# Patient Record
Sex: FEMALE | Race: WHITE | NOT HISPANIC OR LATINO | Employment: OTHER | URBAN - METROPOLITAN AREA
[De-identification: names, ages, dates, MRNs, and addresses within clinical notes are randomized per-mention and may not be internally consistent; named-entity substitution may affect disease eponyms.]

---

## 2018-10-22 ENCOUNTER — TELEPHONE (OUTPATIENT)
Dept: PSYCHOLOGY | Facility: CLINIC | Age: 58
End: 2018-10-22

## 2018-10-22 ENCOUNTER — TRANSCRIBE ORDERS (OUTPATIENT)
Dept: ADMINISTRATIVE | Facility: HOSPITAL | Age: 58
End: 2018-10-22

## 2018-10-22 ENCOUNTER — APPOINTMENT (OUTPATIENT)
Dept: LAB | Facility: IMAGING CENTER | Age: 58
End: 2018-10-22
Payer: COMMERCIAL

## 2018-10-22 DIAGNOSIS — E78.5 HYPERLIPIDEMIA, UNSPECIFIED HYPERLIPIDEMIA TYPE: ICD-10-CM

## 2018-10-22 DIAGNOSIS — R41.3 MEMORY LOSS: ICD-10-CM

## 2018-10-22 DIAGNOSIS — F45.8 ANXIETY HYPERVENTILATION: ICD-10-CM

## 2018-10-22 DIAGNOSIS — E78.5 HYPERLIPIDEMIA, UNSPECIFIED HYPERLIPIDEMIA TYPE: Primary | ICD-10-CM

## 2018-10-22 DIAGNOSIS — F41.9 ANXIETY HYPERVENTILATION: ICD-10-CM

## 2018-10-22 DIAGNOSIS — I10 ESSENTIAL HYPERTENSION, MALIGNANT: ICD-10-CM

## 2018-10-22 DIAGNOSIS — R41.3 MEMORY LOSS: Primary | ICD-10-CM

## 2018-10-22 LAB
25(OH)D3 SERPL-MCNC: 32.7 NG/ML (ref 30–100)
ALBUMIN SERPL BCP-MCNC: 4 G/DL (ref 3.5–5)
ALP SERPL-CCNC: 65 U/L (ref 46–116)
ALT SERPL W P-5'-P-CCNC: 24 U/L (ref 12–78)
ANION GAP SERPL CALCULATED.3IONS-SCNC: 4 MMOL/L (ref 4–13)
AST SERPL W P-5'-P-CCNC: 12 U/L (ref 5–45)
BASOPHILS # BLD AUTO: 0.07 THOUSANDS/ΜL (ref 0–0.1)
BASOPHILS NFR BLD AUTO: 1 % (ref 0–1)
BILIRUB SERPL-MCNC: 0.49 MG/DL (ref 0.2–1)
BUN SERPL-MCNC: 13 MG/DL (ref 5–25)
CALCIUM SERPL-MCNC: 9.2 MG/DL (ref 8.3–10.1)
CHLORIDE SERPL-SCNC: 103 MMOL/L (ref 100–108)
CHOLEST SERPL-MCNC: 176 MG/DL (ref 50–200)
CO2 SERPL-SCNC: 30 MMOL/L (ref 21–32)
CREAT SERPL-MCNC: 0.83 MG/DL (ref 0.6–1.3)
EOSINOPHIL # BLD AUTO: 0.14 THOUSAND/ΜL (ref 0–0.61)
EOSINOPHIL NFR BLD AUTO: 3 % (ref 0–6)
ERYTHROCYTE [DISTWIDTH] IN BLOOD BY AUTOMATED COUNT: 12.8 % (ref 11.6–15.1)
GFR SERPL CREATININE-BSD FRML MDRD: 77 ML/MIN/1.73SQ M
GLUCOSE P FAST SERPL-MCNC: 84 MG/DL (ref 65–99)
HCT VFR BLD AUTO: 41.3 % (ref 34.8–46.1)
HDLC SERPL-MCNC: 52 MG/DL (ref 40–60)
HGB BLD-MCNC: 13 G/DL (ref 11.5–15.4)
IMM GRANULOCYTES # BLD AUTO: 0.01 THOUSAND/UL (ref 0–0.2)
IMM GRANULOCYTES NFR BLD AUTO: 0 % (ref 0–2)
LDLC SERPL CALC-MCNC: 93 MG/DL (ref 0–100)
LYMPHOCYTES # BLD AUTO: 1.7 THOUSANDS/ΜL (ref 0.6–4.47)
LYMPHOCYTES NFR BLD AUTO: 30 % (ref 14–44)
MCH RBC QN AUTO: 29.4 PG (ref 26.8–34.3)
MCHC RBC AUTO-ENTMCNC: 31.5 G/DL (ref 31.4–37.4)
MCV RBC AUTO: 93 FL (ref 82–98)
MONOCYTES # BLD AUTO: 0.58 THOUSAND/ΜL (ref 0.17–1.22)
MONOCYTES NFR BLD AUTO: 10 % (ref 4–12)
NEUTROPHILS # BLD AUTO: 3.16 THOUSANDS/ΜL (ref 1.85–7.62)
NEUTS SEG NFR BLD AUTO: 56 % (ref 43–75)
NONHDLC SERPL-MCNC: 124 MG/DL
NRBC BLD AUTO-RTO: 0 /100 WBCS
PLATELET # BLD AUTO: 312 THOUSANDS/UL (ref 149–390)
PMV BLD AUTO: 8.9 FL (ref 8.9–12.7)
POTASSIUM SERPL-SCNC: 4.8 MMOL/L (ref 3.5–5.3)
PROT SERPL-MCNC: 7.8 G/DL (ref 6.4–8.2)
RBC # BLD AUTO: 4.42 MILLION/UL (ref 3.81–5.12)
SODIUM SERPL-SCNC: 137 MMOL/L (ref 136–145)
TRIGL SERPL-MCNC: 156 MG/DL
TSH SERPL DL<=0.05 MIU/L-ACNC: 1.89 UIU/ML (ref 0.36–3.74)
VIT B12 SERPL-MCNC: 593 PG/ML (ref 100–900)
WBC # BLD AUTO: 5.66 THOUSAND/UL (ref 4.31–10.16)

## 2018-10-22 PROCEDURE — 82306 VITAMIN D 25 HYDROXY: CPT

## 2018-10-22 PROCEDURE — 80053 COMPREHEN METABOLIC PANEL: CPT

## 2018-10-22 PROCEDURE — 83918 ORGANIC ACIDS TOTAL QUANT: CPT

## 2018-10-22 PROCEDURE — 85025 COMPLETE CBC W/AUTO DIFF WBC: CPT

## 2018-10-22 PROCEDURE — 84443 ASSAY THYROID STIM HORMONE: CPT

## 2018-10-22 PROCEDURE — 80061 LIPID PANEL: CPT

## 2018-10-22 PROCEDURE — 82607 VITAMIN B-12: CPT

## 2018-10-22 PROCEDURE — 36415 COLL VENOUS BLD VENIPUNCTURE: CPT

## 2018-10-22 NOTE — TELEPHONE ENCOUNTER
[facility] Intake Assessment     Presenting Stressors: PATIENT IS FEELING LOSS DEPRESSED AND ANXIOUS PATIENT STATES SHE DOES NOT FEEL UNSAFE RAPID CYCLING  Referral Source: DR Mickey Lal   she is employed at [address] to [employer]? No  Is she a smoker? no    Symptoms: depressed mood, anxiety and SLEEP DISTRUBANCE LOST OF APPETITE      No current outpatient prescriptions on file  Medications: BUSPAR 15 MG 2 IN AM 1 PM, EFFEXOR 225 MG 1 TAB AM, TRAZADONE 50 MG 1 1/2 HS, LORAZAPAM 0 5 MG 1 TAB TWICE DAILY, GABAPENTIN 400 MG TWICE DAILY FOR SHINGLES (NUEROPATHY    Allergies not on file    Allergies: NKA    Provisional Diagnosis:   Axis I:DEPRESSION WITH ANXIETY   Axis II: NONE    Substance Abuse:No concerns of substance abuse are reported      Psychiatric Treatment History:     Current psychiatrist: DR Rodo Nicole Agency Supports: NO  The patient requires ambulatory assistance: NO    Legal Issues: NO LEGAL ISSUES    Action: NONE    ACCEPTED Appointment Date: NONE    DENIED Reason: NONE

## 2018-10-25 LAB
METHYLMALONATE SERPL-SCNC: 236 NMOL/L (ref 0–378)
SL AMB DISCLAIMER: NORMAL

## 2018-11-07 ENCOUNTER — TELEPHONE (OUTPATIENT)
Dept: PSYCHOLOGY | Facility: CLINIC | Age: 58
End: 2018-11-07

## 2018-11-07 NOTE — TELEPHONE ENCOUNTER
----- Message from Clarisa Collins sent at 10/22/2018  2:41 PM EDT -----  Zeynepa Fix - but if unsafe direct to ED  ----- Message -----  From: Riley Piedra  Sent: 10/22/2018   2:03 PM  To: Jeremiah Julian FROM DR Gary Redding

## 2019-01-28 ENCOUNTER — TRANSCRIBE ORDERS (OUTPATIENT)
Dept: ADMINISTRATIVE | Facility: HOSPITAL | Age: 59
End: 2019-01-28

## 2019-01-28 ENCOUNTER — HOSPITAL ENCOUNTER (OUTPATIENT)
Dept: RADIOLOGY | Facility: IMAGING CENTER | Age: 59
Discharge: HOME | End: 2019-01-28
Payer: COMMERCIAL

## 2019-01-28 DIAGNOSIS — R05.9 COUGH: Primary | ICD-10-CM

## 2019-01-28 DIAGNOSIS — R05.9 COUGH: ICD-10-CM

## 2019-01-28 PROCEDURE — 71046 X-RAY EXAM CHEST 2 VIEWS: CPT

## 2019-02-04 ENCOUNTER — TELEPHONE (OUTPATIENT)
Dept: PSYCHOLOGY | Facility: CLINIC | Age: 59
End: 2019-02-04

## 2019-02-14 ENCOUNTER — TELEPHONE (OUTPATIENT)
Dept: PSYCHOLOGY | Facility: CLINIC | Age: 59
End: 2019-02-14

## 2019-02-14 NOTE — TELEPHONE ENCOUNTER
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 No  Is she a smoker? no    Symptoms: depressed mood, anxiety and SLEEP DISTRUBANCE APPETITE CHANGES      No current outpatient medications on file  Medications: LORAZAPAM 0 5 MG TWICE DAILY, TRAZADONE 50 MG 1/1/2 TAB AT NIGHT, EFFERXOR 225 MG DAILY AM, ABILIFY 5MG ONCE IN PM    Allergies not on file    Allergies: NKA    Provisional Diagnosis:   Axis I:DEPRESSION SERVERE DISORDER   Axis II: ANXIETY    Substance Abuse:No concerns of substance abuse are reported      Psychiatric Treatment History:     Current psychiatrist: DR Florence Andre  Therapist: SPENCER PAYNE  Community Agency Supports: NO  The patient requires ambulatory assistance: NO    Legal Issues: NO LEGAL ISSUES    Action: NONE    ACCEPTED Appointment Date: NONE    DENIED Reason: NONE

## 2019-02-25 ENCOUNTER — OFFICE VISIT (OUTPATIENT)
Dept: PSYCHIATRY | Facility: CLINIC | Age: 59
End: 2019-02-25
Payer: COMMERCIAL

## 2019-02-25 ENCOUNTER — OFFICE VISIT (OUTPATIENT)
Dept: PSYCHOLOGY | Facility: CLINIC | Age: 59
End: 2019-02-25
Payer: COMMERCIAL

## 2019-02-25 VITALS
BODY MASS INDEX: 30.76 KG/M2 | RESPIRATION RATE: 18 BRPM | DIASTOLIC BLOOD PRESSURE: 60 MMHG | HEIGHT: 67 IN | WEIGHT: 196 LBS | HEART RATE: 82 BPM | SYSTOLIC BLOOD PRESSURE: 110 MMHG | TEMPERATURE: 98.1 F

## 2019-02-25 DIAGNOSIS — F33.2 SEVERE RECURRENT MAJOR DEPRESSION WITHOUT PSYCHOTIC FEATURES (HCC): Primary | ICD-10-CM

## 2019-02-25 DIAGNOSIS — F33.2 MDD (MAJOR DEPRESSIVE DISORDER), RECURRENT SEVERE, WITHOUT PSYCHOSIS (HCC): Primary | ICD-10-CM

## 2019-02-25 DIAGNOSIS — F41.1 GENERALIZED ANXIETY DISORDER: ICD-10-CM

## 2019-02-25 PROBLEM — R41.3 MEMORY LOSS: Status: ACTIVE | Noted: 2018-07-02

## 2019-02-25 PROBLEM — B02.8 HERPES ZOSTER COMPLICATED: Status: ACTIVE | Noted: 2018-07-02

## 2019-02-25 PROBLEM — R53.83 FATIGUE: Status: ACTIVE | Noted: 2019-02-25

## 2019-02-25 PROBLEM — G47.00 INSOMNIA: Status: ACTIVE | Noted: 2019-02-25

## 2019-02-25 PROBLEM — J30.9 ALLERGIC RHINITIS: Status: ACTIVE | Noted: 2019-02-25

## 2019-02-25 PROBLEM — M19.90 OSTEOARTHRITIS: Status: ACTIVE | Noted: 2019-02-25

## 2019-02-25 PROCEDURE — G0410 GRP PSYCH PARTIAL HOSP 45-50: HCPCS

## 2019-02-25 PROCEDURE — 90791 PSYCH DIAGNOSTIC EVALUATION: CPT

## 2019-02-25 PROCEDURE — S0201 PARTIAL HOSPITALIZATION SERV: HCPCS

## 2019-02-25 PROCEDURE — G0177 OPPS/PHP; TRAIN & EDUC SERV: HCPCS

## 2019-02-25 PROCEDURE — 90792 PSYCH DIAG EVAL W/MED SRVCS: CPT | Performed by: PSYCHIATRY & NEUROLOGY

## 2019-02-25 RX ORDER — LOSARTAN POTASSIUM 50 MG/1
50 TABLET ORAL DAILY
COMMUNITY
Start: 2016-05-05

## 2019-02-25 RX ORDER — FLUTICASONE PROPIONATE 50 MCG
2 SPRAY, SUSPENSION (ML) NASAL DAILY
COMMUNITY

## 2019-02-25 RX ORDER — LOVASTATIN 10 MG/1
10 TABLET ORAL
COMMUNITY

## 2019-02-25 RX ORDER — ALBUTEROL SULFATE 90 UG/1
2 AEROSOL, METERED RESPIRATORY (INHALATION) EVERY 4 HOURS PRN
COMMUNITY

## 2019-02-25 RX ORDER — LORAZEPAM 0.5 MG/1
0.5 TABLET ORAL 2 TIMES DAILY
Qty: 60 TABLET | Refills: 0 | Status: SHIPPED | OUTPATIENT
Start: 2019-02-25

## 2019-02-25 RX ORDER — VENLAFAXINE HYDROCHLORIDE 225 MG/1
225 TABLET, EXTENDED RELEASE ORAL DAILY
COMMUNITY
End: 2019-02-26 | Stop reason: SDUPTHER

## 2019-02-25 RX ORDER — GABAPENTIN 400 MG/1
400 CAPSULE ORAL 2 TIMES DAILY
COMMUNITY

## 2019-02-25 RX ORDER — RANITIDINE 150 MG/1
1 TABLET ORAL EVERY 24 HOURS
COMMUNITY
Start: 2016-06-07

## 2019-02-25 RX ORDER — LORAZEPAM 0.5 MG/1
0.5 TABLET ORAL 2 TIMES DAILY
COMMUNITY
End: 2019-02-25 | Stop reason: SDUPTHER

## 2019-02-25 RX ORDER — TRAZODONE HYDROCHLORIDE 50 MG/1
75 TABLET ORAL
COMMUNITY

## 2019-02-25 RX ORDER — ARIPIPRAZOLE 5 MG/1
5 TABLET ORAL DAILY
COMMUNITY
End: 2019-02-26 | Stop reason: SDUPTHER

## 2019-02-25 NOTE — PSYCH
Assessment/Plan:      Diagnoses and all orders for this visit:    Severe recurrent major depression without psychotic features ([facility] [address] )          Subjective:     Patient ID: Doris Mares is a 59 y o  female  [facility] Treatment Plan   AREAS OF NEED:  Depression as evidence by poor sleep, fatigue, anhedonia, emotional dysregulation r/t loss of job due to illness  Date Initiated: 02/25/2019    Strengths: Good supportive , willing to seek treatment, resilient and well educated  LONG TERM GOAL:   Date Initiated: 02/25/2019  1 0 I will demonstrate 2 new ways of verbal expression of thoughts and feelings that I have learned through program   Target Date: 03/25/2019  Completion Date:      SHORT TERM OBJECTIVES:     Date Initiated: 02/25/2019  1 1  I will increase my emotional vocabulary and practice " I" statements and feelings that I have learned in program   Revision Date:   Target Date: 03/06/2019  Completion Date:     Date Initiated: 02/25/2019  1 2 I will identify 1 negative thought daily and learn cognitive skills to help reframe my negative thinking into more adaptive thinking  Revision Date:  Target Date: 03/06/2019  Completion Date:    Date Initiated: 02/25/2019  1 3 I will take medications as prescribed and share any questions and concerns if arise  Revision Date:  Target Date:  03/06/2019  Completion Date:      Date Initiated:02/25/2019  1 4 I will identify 3 ways my supports can assist in my recovery and agree to staff/support contact as indicated    Revision Date:  Target Date: 03/06/2019  Completion Date:           7 DAY REVISION:    Date Initiated:  Revision Date:   Target Date:   Completion Date:      PSYCHIATRY:  Date Initiated: 02/25/2019  Medication Management and Education       Revision Date:   The person(s) responsible for carrying out the plan is Sawyer Maharaj MD    NURSING:   Date Initiated:  02/25/2019  1 1,1 2,1 3,1 4 This RN will provide daily wellness group five days weekly to educate Barbara Ocampo on S/S of her diagnoses and medications used in treatment  Revision Date:  The person(s) responsible for carrying out the plan is Ambreen Clemente RN    PSYCHOLOGY:   Date Initiated:  02/25/2019       1 1, 1 2, 1 4 Provide psychotherapy group 5 times per week to allow opportunity for Barbara Ocampo  to explore stressors and ways of coping  Revision Date:   The person(s) responsible for carrying out the plan is Nahum Moralez [place]    ALLIED THERAPY:   Date Initiated:  02/25/2019  1 1,1 2 Engage Barbara Ocampo in AT group 5 times daily to encourage development and use of wellness tools to decrease symptoms and promote recovery through meaningful activity  Revision Date:   The person(s) responsible for carrying out the plan is JUDI Eubanks     CASE MANAGEMENT:   Date Initiated:  02/25/2019      1 0 This  will meet with Barbara Ocampo  3-4 times weekly to assess treatment progress, discharge planning, connection to community supports and UR as indicated  Revision Date:   The person(s) responsible for carrying out the plan is Ambreen Clemente RN         TREATMENT REVIEW/COMMENTS:   Discharge Criteria:  Identify 3 signs of progress and complete relapse prevention plan  DISCHARGE PLAN: Continue with Dr Owen Cheadle (OP Psychiatrist ) for medication management and with Mer Park (OP therapist)  Estimated Length of Stay : 10 treatment days  Diagnosis and Treatment Plan explained to Luis Miguel Lund relates understanding diagnosis and is agreeable to Treatment Plan       CLIENT COMMENTS / Please share your thoughts, feelings, need and/or experiences regarding your treatment plan: _____________________________________________________________________________________________________________________________________________________________________________________________________________________________________________________________________________________________________________________ Date/Time: ______________   Patient Signature: _________________________________   Date/Time: ______________    Signature: _________________________________        Date/Time: ______________

## 2019-02-25 NOTE — PSYCH
Assessment/Plan:      Diagnoses and all orders for this visit:    Severe recurrent major depression without psychotic features ([facility] [address] )          Subjective:     Patient ID: Al Iyer is a 59 y o  female  Pre-morbid level of function and History of Present Illness:   HPI : Per MD Eric Young a 59 y o  female with depression  and anxiety  referred by  Dr Costa Arroyo to depressed mood, anxiety ,  sleep disturbance and  appetite changes    Onset of symptoms was a few months ago with gradually worsening course since that time  Psychosocial Stressors: health  She developed Shingles on 9/2017 and has lot of sequela  She had a neuropsychiatric evaluation and  Was found that her executive function has decreased , she was not able to continue working and that has affected her life  She has feel more depressed since them  She states her  is very supportive  Guru Tom  anxious, denies any suicidal or homicidal ideation, plan or intent  She denies any psychotic symptoms or manic episode  Her PHQ-9 is 11       Per this writer: Jess Munoz [facility] from Dr Katerina Fu with increased depression and anxiety  Jess Baker stopped working 2 years ago after she developed shingles that left her with a lot of mental deficits  She was unable to continue her job of 30 years as an  and as a result had to go on long term disability August 2018  She stated, " ever since I admitted that I was a person on disability I have been depressed, anxious and I feel useless  I was good at my job and now I have nothing to do all day except take care of my 3 dogs and 1 cat  That is the highlight of my day  My , Eduardo Ricardo, is very supportive and encourages me to go out and socialize but I do not have the strength not the desire to do that   I need to get my life to mean something and not feel helpless " Jess Baker denies SI/HI and is not psychotic or delusional on assessment  She lives with her  of 7 yrs and never had children        Strengths: Good supportive , willing to seek treatment, resilient and well educated                 Reason for evaluation and partial hospitalization as an alternative to inpatient hospitalization PHP is medically necessary to prevent hospitalization as outpatient care has been unable to stabilize [facility] and a greater intensity of treatment is indicated  Milieu therapy to monitor for medication needs, provide wellness tools education and offer opportunity to share and connect to others  Group therapy, case management, psychiatric medication management, family contact and UR as indicated  ELOS 10 treatment days  Previous Psychiatric/psychological treatment/year: Past Inpatient Psychiatric Treatment:   Never admitted, was  In Partial on the past     Past Outpatient Psychiatric Treatment:    Current psychiatrist: Dr Shira Gandara  Past Suicide Attempts:              UD    Outpatient and/or Partial and Other Community Resources Used (CTT, ICM, VNA): N/A      Problem Assessment:     SOCIAL/VOCATION:  Family Constellation (include parents, relationship with each and pertinent Psych/Medical History):     Family History   Problem Relation Age of Onset    Diabetes Mother     Hypertension Mother     Lupus Mother         systemic lupus erythematosus    Throat cancer Father        Family History   Problem Relation Age of Onset    Diabetes Mother      Hypertension Mother      Lupus Mother           systemic lupus erythematosus    Throat cancer Father        Social History:     Education: high school diploma/GED  Learning Disabilities: none  Marital history:   Living arrangement, social support: she lives with her     Occupational History: on permanent disability  Functioning Relationships: good support system    Other Pertinent History: no legal or Bridgette Adolfo        Who is the person you relate to best : , Jona Olson she lives with her     she does not live alone  Domestic Violence: No past history of domestic violence        LEISURE ASSESSMENT (Include past and present hobbies/interests and level of involvement (Ex: Group/Club Affiliations): None  Her primary language is [place]  Preferred language is [place]  Ethnic considerations are cauasian  Religions affiliations and level of involvement Lutheran   FUNCTIONAL STATUS: There has been a recent change in the patient's ability to do the following: meal preparation    Level of Assistance Needed/By Whom?: independent      [facility] learns best by  reading and listening    SUBSTANCE ABUSE ASSESSMENT: no substance abuse    Do you currently smoke? NoOffered smoking cessation? No    Substance/Route/Age/Amount/Frequency/Last Use: None    DETOX HISTORY: n/a    Previous detox/rehab treatment: None    HEALTH ASSESSMENT: no nausea and no vomiting    LEGAL: No Mental Health Advance Directive or Power of  on file    Risk Assessment:   The following ratings are based on my observation of this patient over the last interview assesssment    Risk of Harm to Self:   Demographic risk factors include   Historical Risk Factors include none  Recent Specific Risk Factors include diagnosis of depression     Risk of Harm to Others:   Demographic Risk Factors include unemployed  Historical Risk Factors include N/A  Recent Specific Risk Factors include N/A    Access to Weapons: [facility] has access to the following weapons: None   The following steps have been taken to ensure weapons are properly secured:N/A    Based on the above information, the client presents the following risk of harm to self or others:  low    The following interventions are recommended:   no intervention changes    Notes regarding this Risk Assessment:  On call no given     Review Of Systems:     Mood Anxiety and Depression   Behavior Normal    Thought Content Normal   General Normal    Personality Normal   Other Psych Symptoms Normal   Constitutional Negative   ENT Negative   Cardiovascular Negative   Respiratory Negative   Gastrointestinal Negative   Genitourinary Negative   Musculoskeletal Negative   Integumentary Negative   Neurological    Endocrine Normal    Other Symptoms Normal        Mental status:  Appearance calm and cooperative , adequate hygiene and grooming and good eye contact    Mood depressed   Affect affect was flat   Speech a normal rate   Thought Processes coherent/organized   Hallucinations no hallucinations present    Thought Content no delusions   Abnormal Thoughts no suicidal thoughts  and no homicidal thoughts    Orientation  oriented to person and place and time   Remote Memory short term memory intact and long term memory intact   Attention Span concentration intact   Intellect Appears to be of Average Intelligence   Fund of Knowledge displays adequate knowledge of current events, adequate fund of knowledge regarding past history and adequate fund of knowledge regarding vocabulary    Insight Insight intact   Judgement judgment was intact   Muscle Strength Muscle strength and tone were normal   Language no difficulty naming common objects, no difficulty repeating a phrase  and no difficulty writing a sentence    Pain none   Pain Scale 0       DSM:   1   Se[facility] features [facility]         Plan[facility]    Anticipated aftercare plan:  Continue with Dr Katerina Fu( OP Psychiatrist) and Nina Felton (OP Therapist)

## 2019-02-25 NOTE — PSYCH
Subjective:     Patient ID: Palmoa Moon is a 59 y o  female  [facility] Clinical Progress Notes      Specialized Services Documentation  Therapist must complete separate progress note for each specific clinical activity in which the individual participated during the day  PHQ-9 Depression Screening    PHQ-9:    Frequency of the following problems over the past two weeks:       Little interest or pleasure in doing things:  1 - several days  Feeling down, depressed, or hopeless:  1 - several days  Trouble falling or staying asleep, or sleeping too much:  2 - more than half the days  Feeling tired or having little energy:  2 - more than half the days  Poor appetite or overeatin - several days  Feeling bad about yourself - or that you are a failure or have let yourself or your family down:  1 - several days  Trouble concentrating on things, such as reading the newspaper or watching television:  1 - several days  Moving or speaking so slowly that other people could have noticed   Or the opposite - being so fidgety or restless that you have been moving around a lot more than usual:  2 - more than half the days  Thoughts that you would be better off dead, or of hurting yourself in some way:  0 - not at all  PHQ-2 Score:  2  PHQ-9 Score:  11

## 2019-02-25 NOTE — PSYCH
Subjective:      Patient ID: Bobby Vela is a 59 y o  female      [facility]ations Clinical Progress Notes       Specialized Services Documentation  Therapist must complete separate progress note for each specific clinical activity in which the individual participated during the day          Group Psychotherapy ([phone]) Mati Hayes, [facility]      Bobby Vela attended wellness group today on : Living with Bipolar: Educational Video entitled [employer] viewed  Discussion  started off with definition of Bipolar Disorder which can be referred  as  manic-depressive disorder    Video viewed  After  the video discussion  began with  : For people living with Bipolar , the view of life can seem dark and ominous or deceptively beautiful  Bipolar illness can twist and distort the mind of physically healthy individuals, to the point that their jobs their families and even lives can be lost   But with education, therapy and medication management, people can regain what they have lost and perhaps live in a world better than they have known  Information provided on all the resources available to treat this disease  Support groups like [facility] ([address]) is a great tool available for people diagnosed with this illness, families and friends  Was able to understand better that with proper diagnosis,  medication and education, this illness is manageable and treatable  Taiwo Rolon did verbalize that medication compliance as well as therapy is so important for mental health    She was able to recognize that " it caught me off guard when they used the word "strange" to describe mental illness and how much education is still needed to get rid of the sigma associated with mental illness " Taiwo Rolon will continue to attend wellness groups in order to achieve goals and objectives                    Tx Plan Objective 1 1,1 2,1 3,1 4     Kat Stewart from [place] [address] ([phone]) authorized 5 PHP days (02/25/2019-03/01/2019)  Auth No  [id number]  Case Management Note Evelina Vaughan RN ([phone])      Per this writer: Shital Lin to [facility] from Dr Colleen Manzo with increased depression and anxiety  Eric Simon stopped working 2 years ago after she developed shingles that left her with a lot of mental deficits  Liam Mcclain was unable to continue her job of 30 years as an  and as a result had to go on long term disability August 2018   She stated, " ever since I admitted that I was a person on disability I have been depressed, anxious and I feel useless   I was good at my job and now I have nothing to do all day except take care of my 3 dogs and 1 cat   That is the highlight of my day   My , Brenden Montelongo, is very supportive and encourages me to go out and socialize but I do not have the strength not the desire to do that  I need to get my life to mean something and not feel helpless " Vick Goff denies SI/HI and is not psychotic or delusional on assessment   She lives with her  of 7 yrs and never had children        Strengths: Good supportive , willing to seek treatment, resilient and well educated                Current suicide risk : Low   Medications changes/added/denied? No   Treatment session number: No 1   Individual Case Management Visit provided today? Yes    [facility]ations follow up physician's orders: None at this time

## 2019-02-25 NOTE — PSYCH
Subjective:     Patient ID: Yeny Mccray is a 59 y o  female  In[facility]ations Clinical Progress Notes      Specialized Services Documentation  Therapist must complete separate progress note for each specific clinical activity in which the individual participated during the day  Group Psychotherapy     ([id number]) Yeny Mccray participated in group psychotherapy process today  Group began with clients participating in a mindfulness exercise focusing on becoming present in this moment  Clients checked in, shared how they were feeling, and progress they made over the weekend or a setback they encountered  Clients engaged in a discussion on current struggles and offered support to one another  Clients imparted on information and shared with others their struggles in relationships, sleep, and overwhelming themselves  They built group cohesiveness and shared experiencing, growing their universality  This writer shared insight on building SMART goals, and clients discussed struggles with goals  Guille Murray shared feeling "okay" and she demonstrated self-restraint over the weekend  She was supportive of a peer despite it being her first day and offered support based upon her experience working for 30 years in a high stress position  Tyler Blanco made progress towards goals through group participation and is encouraged to continue progressing towards long term goals through program compliance and participation       Tx Plan Objective: 1 1 Therapist:  Saul Odonnell LPC

## 2019-02-25 NOTE — PSYCH
Subjective:     Patient ID: Faiza Sanford is a 59 y o  female  [facility]ations Clinical Progress Notes      Specialized Services Documentation  Therapist must complete separate progress note for each specific clinical activity in which the individual participated during the day  Allied Therapy  [id number]  Faiza Sanford was excused from [facility] as she was undergoing the intake process    Therapist: Rosario Heard    Education Therapy   Time:  2255-3448  Previous goal met: First Treatment Day  Readiness to Learning: Receptive  Barriers to Learning: None  Learning Assessment  Time: 3675-6935  Education Completed: Illness, Medication and Wellness Tools  Teaching Method: Verbal and Demonstration  Shared Area of Learning: Yes   Goal Set: Exercise  Tx Plan Objective: 1 1, Therapist: Rosario Heard

## 2019-02-26 ENCOUNTER — OFFICE VISIT (OUTPATIENT)
Dept: PSYCHOLOGY | Facility: CLINIC | Age: 59
End: 2019-02-26
Payer: COMMERCIAL

## 2019-02-26 DIAGNOSIS — F33.2 SEVERE RECURRENT MAJOR DEPRESSION WITHOUT PSYCHOTIC FEATURES (HCC): ICD-10-CM

## 2019-02-26 DIAGNOSIS — F33.2 MDD (MAJOR DEPRESSIVE DISORDER), RECURRENT SEVERE, WITHOUT PSYCHOSIS (HCC): Primary | ICD-10-CM

## 2019-02-26 PROCEDURE — G0177 OPPS/PHP; TRAIN & EDUC SERV: HCPCS

## 2019-02-26 PROCEDURE — S0201 PARTIAL HOSPITALIZATION SERV: HCPCS

## 2019-02-26 PROCEDURE — G0176 OPPS/PHP;ACTIVITY THERAPY: HCPCS

## 2019-02-26 PROCEDURE — G0410 GRP PSYCH PARTIAL HOSP 45-50: HCPCS

## 2019-02-26 RX ORDER — ARIPIPRAZOLE 5 MG/1
5 TABLET ORAL
Qty: 30 TABLET | Refills: 0 | Status: SHIPPED | OUTPATIENT
Start: 2019-02-26

## 2019-02-26 RX ORDER — VENLAFAXINE HYDROCHLORIDE 225 MG/1
225 TABLET, EXTENDED RELEASE ORAL DAILY
Qty: 30 TABLET | Refills: 0 | Status: SHIPPED | OUTPATIENT
Start: 2019-02-26

## 2019-02-26 NOTE — PSYCH
Subjective:     Patient ID: Aniket Aguilera is a 59 y o  female  [facility]ations Clinical Progress Notes      Specialized Services Documentation  Therapist must complete separate progress note for each specific clinical activity in which the individual participated during the day  Group Psychotherapy ([phone]) Alanis Vega, RN     Sohail Munoz  actively attended wellness group today - Sleep Hygiene Protocol - Discussion began :   When You Cannot Sleep, What To Do Instead Of Ruminating  Sleep is necessary for mental, emotional and physical wellness  Developing a routine that works for you is imperative and will differ from individual to individual but certain strategies to manage anxiety during the day and early evening can help reduce the inability to get a peaceful sleep  To Increase The Likelihood OF Restfulness/Sleep:   (1) Develop and follow a consistent sleep schedule even on weekends  (2) Do not use your bed in the day time  (3) When prepared to sleep, turn off the light and keep the room quiet and the temperature comfortable and relatively cool  (4) Give yourself half an hour to at most an hour to fall sleep  If this does not work evaluate whether you are calm, anxious or ruminating  (5) Do not catastrophize  If You Are Calm But Wide Awake:  (6) Get out of bed, go to another room and read a book  (7) Try a light snack (e g  an apple)  (8) If you are anxious/ ruminating: use meditating, reassure yourself  In order to maintain restful sleep emotional load has to be eliminated and our brain has to be in a resting mode  Sohail Almeida was able to see what she can do to try to develop a better sleep hygiene   She identified that " If I over stimulate my mind 2 hrs before bed I am up all night  I wind myself down at least  3 hrs before I go to bed and this has been working for me  I try not to be anxious and if I find myself anxious I mentally talk to myself and calm myself down   I also stick to the same routine even on the weekends as my  is off on the weekends but I still do the same routine  I find if I stay up with him while he does his social medica thing at night I am not going to get a restful night sleep "  Jacob Hou is making progress towards wellness and will continue to attend wellness groups in order to achieve goals and objectives  Tx Plan Objective: 1 1,1 2      Case Management Note Oc Pollard RN ([phone])    Met with Tabatha Joy today to review her treatment plan which she was in agreement of  She presently denies SI/HI and she is not paranoid or psychotic  She still co " Anxiety and being overwhelmed but here at this program to work on that  I am hopeful I see results and use the coping skills I learn from here to reduce my anxiety and worry "    Current suicide risk : Low     Medications changes/added/denied? No    Treatment session number: 2    Individual Case Management Visit provided today?  Yes     [facility]ations follow up physician's orders: Dr Tera Alcantar MD called in Abilify 5 mg PO and Effexor 225 mg Po to her pharmacy at [facility] request

## 2019-02-26 NOTE — PSYCH
Subjective:     Patient ID: Cornelio Stafford is a 59 y o  female  [facility] Clinical Progress Notes      Specialized Services Documentation  Therapist must complete separate progress note for each specific clinical activity in which the individual participated during the day  Group Psychotherapy     ([id number]) Cornelio Stafford participated in group psychotherapy process today  Group began with clients participating in a mindfulness exercise targeting guided imagery  Clients checked-in and identified how they were feeling and something they are stuck on  Check-in lasted the duration of group, with peers responding to one another and engaging in open discussion regarding stuck points and relating to one another  Clients supported one another and identified areas of growth  Tim Patten shared feeling quiet today, and she shared her recent challenges with relearning how to live her life due to medical diagnoses  Tim Patten was open with the group and supportive of others  Tim Patten made progress towards goals through group participation and is encouraged to continue progressing towards long term goals through program compliance and participation      Tx Plan Objective: 1 2 Therapist:  Diana Chávez LPC

## 2019-02-26 NOTE — PSYCH
Subjective:     Patient ID: Santa Harp is a 59 y o  female  [facility] Clinical Progress Notes      Specialized Services Documentation  Therapist must complete separate progress note for each specific clinical activity in which the individual participated during the day  Allied Therapy  8513-5297  Santa Harp actively participated in [facility] group focused on DBT skill Distress Tolerance  Group explored distress tolerance skills STOP, Pros and Cons, TIP, and distracting with ACCEPTS  Sue Elizalde was observed to engage in progressive muscle relaxation and task  She shared that relaxation was helpful to her  Group reviewed goals of distress tolerance as well as when to use the skills to get through crisis situations  Sue Elizalde shared that the STOP skill would be useful for her  Initial progress towards goal noted  Continue AT to explore coping strategies and encourage daily practice    Tx Plan Objective: 1 2, Therapist: Luiza Segovia    Education Therapy   Time:  6568-9083  Previous goal met: Yes   Readiness to Learning: Receptive  Barriers to Learning: None  Learning Assessment  Time: 5902-7871  Education Completed: Illness, Medication and Wellness Tools  Teaching Method: Verbal and Demonstration  Shared Area of Learning: Yes   Goal Set: Work out  Tx Plan Objective: 1 1, 1 2, Therapist: Luiza Segovia

## 2019-02-27 ENCOUNTER — OFFICE VISIT (OUTPATIENT)
Dept: PSYCHOLOGY | Facility: CLINIC | Age: 59
End: 2019-02-27
Payer: COMMERCIAL

## 2019-02-27 VITALS
HEART RATE: 80 BPM | DIASTOLIC BLOOD PRESSURE: 78 MMHG | TEMPERATURE: 98.7 F | SYSTOLIC BLOOD PRESSURE: 122 MMHG | RESPIRATION RATE: 18 BRPM

## 2019-02-27 DIAGNOSIS — F33.2 SEVERE RECURRENT MAJOR DEPRESSION WITHOUT PSYCHOTIC FEATURES (HCC): Primary | ICD-10-CM

## 2019-02-27 PROCEDURE — S0201 PARTIAL HOSPITALIZATION SERV: HCPCS

## 2019-02-27 PROCEDURE — G0176 OPPS/PHP;ACTIVITY THERAPY: HCPCS

## 2019-02-27 PROCEDURE — G0177 OPPS/PHP; TRAIN & EDUC SERV: HCPCS

## 2019-02-27 PROCEDURE — G0410 GRP PSYCH PARTIAL HOSP 45-50: HCPCS

## 2019-02-27 NOTE — PSYCH
Subjective:     Patient ID: Jamari Gutierrez is a 59 y o  female  [facility] Clinical Progress Notes      Specialized Services Documentation  Therapist must complete separate progress note for each specific clinical activity in which the individual participated during the day  Group Psychotherapy ([id number]) Alycia Risser, RN [employer] [facility] attended wellness group today on - Anger Management and tips to tame your anger:   Discussion began with : Anger is built on expectations  We expect people to treat us with fairly and when we think they do not treat us fairly we become angry  When there is a gap between expectation and reality, anger is ready to fill that gap  We may decline to act in an anger manner or we can accept  Its your choice  Positive Reactions vs Negative Reactions to dealing with anger:  (1) Walk away instead of reacting in anger  Its OK to stomp your feet if you need to  (2) Talk to someone who you are not feeling angry with  Phone a friend, relative, or professional   Tell them about what happened and how you are feeling  (3) Distract yourself  Do something you enjoy, like reading, listening to music, games going to the store or cooking a meal   This will take your mind off the anger then when you are calmer, come back and deal with it  (4) Count 10 breaths  This will calm and relax you and allow you to pause for a few moments before reacting automatically  (5) Write about it  Get your feelings on paper instead of confronting the source of your anger right away  (6) Get away from the situation  Come back when you are calmer  The more practice you get reacting in positive ways, waiting until the anger goes down a little before responding, and learning new habits, the more easily you will be able to manage your anger    Daily journal to track anger given and she was encouraged to use it to see if there is a pattern related to your angry outburst  [employer] liked the idea of the anger journal and did say : When I see things written down I can relate to it better  I will start to tract the emotions I am feeling before I get angry and see if there is a pattern I can learn to recognize and put a stop to it before it happens "  [employer] is  making progress towards goals and objective and will continue to attend groups towards wellness  Tx Plan Objective: 1 1        Case Management Note Marlyn Collado RN      Met with Jamari Gutierrez today who denies SI/ HI and is not paranoid or psychotic  She is ready for d/c by Friday 03/01/2019 and she has an appointment with her therapist, Ally Gillespie on 03/08/2019 at 0930  Family meeting scheduled with her , Renan Ahn for Friday 03/01/2019 at 1400  Current suicide risk : Low     Medications changes/added/denied? No    Treatment session number: 3    Individual Case Management Visit provided today?  Yes     [facility] follow up physician's orders: none

## 2019-02-27 NOTE — PSYCH
Subjective:     Patient ID: Romy Preston is a 59 y o  female  [facility] Clinical Progress Notes      Specialized Services Documentation  Therapist must complete separate progress note for each specific clinical activity in which the individual participated during the day  Allied Therapy  8554-0364  Romy Preston actively participated in [facility] group focused on DBT skill Emotional Regulation  She appeared interested in the topic and asked appropriate questions  Group explored the goals of emotional regulation as well as the importance of having emotions  Deidra Johnson engaged in lila analysis comparing the avoidance of emotions versus acting impulsively on them  Group engaged in songwriting task describing triggers and feelings associated with emotions  Initial positive progress towards goal noted  Continue AT to increase self-awareness and use of coping skills with daily practice    Tx Plan Objective: 1 1, 1 2, Therapist: Rosetta Jean-Baptiste    Education Therapy   Time:  6459-5927  Previous goal met: No  Readiness to Learning: Receptive  Barriers to Learning: None  Learning Assessment  Time: 8726-0766  Education Completed: Illness and Wellness Tools  Teaching Method: Verbal and Demonstration  Shared Area of Learning: Yes   Goal Set: Work out  Tx Plan Objective: 1 1, 1 2, Therapist: Rosetta Jean-Baptiste

## 2019-02-27 NOTE — PSYCH
Subjective:     Patient ID: Pati Guillory is a 59 y o  female  [facility] Clinical Progress Notes      Specialized Services Documentation  Therapist must complete separate progress note for each specific clinical activity in which the individual participated during the day  Group Psychotherapy     ([id number]) Pati Guillory participated in group psychotherapy process today  Group began with clients participating in a mindfulness exercise targeting deep breathing to build confidence and relaxation  Clients checked in, shared how they were feeling, and a challenging thought they often have  Clients shared current struggles and utilized empathy to relate to one another  Clients discussed their ways of coping and that it has been maladaptive  Clients worked on supporting one another through empowering comments and experiences in their personal lives  Ángel Almendarez shared feeling vulnerable, tired, and regret in expressing herself and felt as if she second guesses herself often  Ángel Almendarez was open with peers and constructive in providing feedback  Ángel Almendarez made progress towards goals through group participation and is encouraged to continue progressing towards long term goals through program compliance and participation       Tx Plan Objective: 1 1 Therapist:  Ministerio Crespo LPC

## 2019-02-28 ENCOUNTER — OFFICE VISIT (OUTPATIENT)
Dept: PSYCHOLOGY | Facility: CLINIC | Age: 59
End: 2019-02-28
Payer: COMMERCIAL

## 2019-02-28 DIAGNOSIS — F33.2 SEVERE RECURRENT MAJOR DEPRESSION WITHOUT PSYCHOTIC FEATURES (HCC): Primary | ICD-10-CM

## 2019-02-28 PROCEDURE — G0410 GRP PSYCH PARTIAL HOSP 45-50: HCPCS

## 2019-02-28 PROCEDURE — G0176 OPPS/PHP;ACTIVITY THERAPY: HCPCS

## 2019-02-28 PROCEDURE — S0201 PARTIAL HOSPITALIZATION SERV: HCPCS

## 2019-02-28 PROCEDURE — G0177 OPPS/PHP; TRAIN & EDUC SERV: HCPCS

## 2019-02-28 NOTE — PSYCH
Subjective:     Patient ID: Lorraine Clemente is a 59 y o  female  [facility] Clinical Progress Notes      Specialized Services Documentation  Therapist must complete separate progress note for each specific clinical activity in which the individual participated during the day  Group Psychotherapy     ([id number]) Lorraine Clemente participated in group psychotherapy process today  Group began with clients participating in a breathing exercise called Box Breathing  Clients checked in, shared how they were feeling, and something that was on their minds  Clients engaged in a discussion on [employer], the concept where if anything can go wrong, it will    Clients shared impersonal and personal examples of [employer] and their emotional connection to it  This writer brought insight to the negative thinking and the cognitive distortion of all or nothing thinking  Clients worked as a group to restructure their thinking and create cognitive flexibility  Helene Orourke shared feeling "good today" and she enjoyed the sunshine this morning  Helene Orourke was actively involved in discussion and related previous learning to the current group  Helene Orourke made progress towards goals through group participation and is encouraged to continue progressing towards long term goals through program compliance and participation      Tx Plan Objective: 1 2 Therapist:  Edith Arrington LPC

## 2019-02-28 NOTE — PSYCH
Subjective:     Patient ID: Jered Kaiser is a 59 y o  female  [facility] Clinical Progress Notes      Specialized Services Documentation  Therapist must complete separate progress note for each specific clinical activity in which the individual participated during the day  Allied Therapy  7044-6803  Jered Kaiser actively participated in [facility] group focused on self-care  Padmini Hurd was observed to engage in relaxation with imagery exercise  Group noted the difference between self-care and selfish  Group engaged in task identifying actions of self-care and barriers to self-care  Group discussed ways to problem solve about barriers to self-care and emphasized that Self-care is not an option  Sekouamy Phillipsdustin shared that prioritizing was an important part of self-care  She shared that she would exercise tonight as an act of self-care  Positive progress towards goal noted  Continue AT to increase self-awareness and use of coping skills with daily practice    Tx Plan Objective: 1 1, 1 2, Therapist: Óscar Baldwin    Education Therapy   Time:  7374-9816  Previous goal met: Yes   Readiness to Learning: Receptive  Barriers to Learning: None  Learning Assessment  Time: 7808-7702  Education Completed: Illness and Wellness Tools  Teaching Method: Verbal and Demonstration  Shared Area of Learning: Yes   Goal Set: Work out  Tx Plan Objective: 1 1, Therapist: Óscar Baldwin

## 2019-02-28 NOTE — PSYCH
Subjective:     Patient ID: Ambar Neely is a 59 y o  female  [facility] Clinical Progress Notes      Specialized Services Documentation  Therapist must complete separate progress note for each specific clinical activity in which the individual participated during the day  Group Psychotherapy ([id number]) SEAMUS Segura attended wellness group today on - Relationships And You-  Discussion began with asking :  (1) List 3 personal traits you like in others:  (2) List 3 personal traits you dislike in others:  (3) Name 3 positive qualities you bring or could bring to a relationship:  (4) Name 3 personal accomplishments or achievements youd enjoy sharing with someone:  (5) List any self-defeating behavior you see in yourself:  (6) Name 3 persons you like from other backgrounds other than you own:  (7) How do you nurture love and friendship:    Emily Garza was able to say " this made me realize how much my  has  Been my support and how much I like in him  Respect is the biggest attribute he brings to my successful marriage "  Emily Garza was able to effectively benefit from this exercise and is making progress towards achieving mental wellness       Tx Plan Objective: 1 4                Case Management Note Renetta Atkinson RN ([id number])    Met with Ambar Neely who did not have any questions or concerns and was able to say she is still ready for d/c tomorrow  She denies SI/HI and is not psychotic or delusional       Current suicide risk : Low     Medications changes/added/denied? No    Treatment session number: 4    Individual Case Management Visit provided today?  Yes     [facility] follow up physician's orders: None

## 2019-03-01 ENCOUNTER — OFFICE VISIT (OUTPATIENT)
Dept: PSYCHOLOGY | Facility: CLINIC | Age: 59
End: 2019-03-01
Payer: COMMERCIAL

## 2019-03-01 DIAGNOSIS — F33.2 SEVERE RECURRENT MAJOR DEPRESSION WITHOUT PSYCHOTIC FEATURES (HCC): Primary | ICD-10-CM

## 2019-03-01 PROCEDURE — G0177 OPPS/PHP; TRAIN & EDUC SERV: HCPCS

## 2019-03-01 PROCEDURE — G0176 OPPS/PHP;ACTIVITY THERAPY: HCPCS

## 2019-03-01 PROCEDURE — G0410 GRP PSYCH PARTIAL HOSP 45-50: HCPCS

## 2019-03-01 PROCEDURE — S0201 PARTIAL HOSPITALIZATION SERV: HCPCS

## 2019-03-01 NOTE — PSYCH
Psych                Show:Clear all  [x]Manual[x]Template[]Copied    Added by:  Elli Falcon RN      []Alexy for details      Assessment/Plan:       Diagnoses and all orders for this visit:     Severe recurrent major depression without psychotic features [facility])            Subjective:      Patient ID: Rodrigo Ward is a 59 y o  female         [facility] Treatment Plan   AREAS OF NEED:  Depression as evidence by poor sleep, fatigue, anhedonia, emotional dysregulation r/t loss of job due to illness  Date Initiated: 02/25/2019     Strengths: Good supportive , willing to seek treatment, resilient and well educated         LONG TERM GOAL:   Date Initiated: 02/25/2019  1 0 I will demonstrate 2 new ways of verbal expression of thoughts and feelings that I have learned through program   Target Date: 03/25/2019  Completion Date:03/01/2019        SHORT TERM OBJECTIVES:      Date Initiated: 02/25/2019  1 1  I will increase my emotional vocabulary and practice " I" statements and feelings that I have learned in program   Revision Date:   Target Date: 03/06/2019  Completion Date: :03/01/2019     Date Initiated: 02/25/2019  1 2 I will identify 1 negative thought daily and learn cognitive skills to help reframe my negative thinking into more adaptive thinking  Revision Date:  Target Date: 03/06/2019  Completion Date::03/01/2019     Date Initiated: 02/25/2019  1 3 I will take medications as prescribed and share any questions and concerns if arise  Revision Date:  Target Date:  03/06/2019  Completion Date:  :03/01/2019     Date Initiated:02/25/2019  1 4 I will identify 3 ways my supports can assist in my recovery and agree to staff/support contact as indicated    Revision Date:  Target Date: 03/06/2019  Completion Date::03/01/2019             7 DAY REVISION:     Date Initiated:  Revision Date:   Target Date:   Completion Date:        PSYCHIATRY:  Date Initiated: 02/25/2019  Medication Management and Education Revision Date:   The person(s) responsible for carrying out the plan is Jadon Caro MD     NURSING:   Date Initiated:  02/25/2019  1 1,1 2,1 3,1 4 This RN will provide daily wellness group five days weekly to educate Clarisa Maher on S/S of her diagnoses and medications used in treatment  Revision Date:  The person(s) responsible for carrying out the plan is Darya Shaffer RN     PSYCHOLOGY:   Date Initiated:  02/25/2019       1 1, 1 2, 1 4 Provide psychotherapy group 5 times per week to allow opportunity for Clarisa Maher  to explore stressors and ways of coping  Revision Date:   The person(s) responsible for carrying out the plan is Laura Ellis MA, [facility] [place], [place]     ALLIED THERAPY:   Date Initiated:  02/25/2019  1 1,1 2 [address] [place] [address] in AT group 5 times daily to encourage development and use of wellness tools to decrease symptoms and promote recovery through meaningful activity  Revision Date:   The person(s) responsible for carrying out the plan is JUDI Campos      CASE MANAGEMENT:   Date Initiated:  02/25/2019      1 0 This  will meet with Clarisa Maher  3-4 times weekly to assess treatment progress, discharge planning, connection to community supports and UR as indicated    Revision Date:   The person(s) responsible for carrying out the plan is Darya Shaffer RN            TREATMENT REVIEW/COMMENTS:   Discharge Criteria:  Identify 3 signs of progress and complete relapse prevention plan       DISCHARGE PLAN: Continue with Dr Charlotte Park (OP Psychiatrist ) for medication management and with Ketty Swartz (OP therapist)  Estimated Length of Stay : 10 treatment days  Diagnosis and Treatment Plan explained to Manuel Li relates understanding diagnosis and is agreeable to Treatment Plan       CLIENT COMMENTS / Please share your thoughts, feelings, need and/or experiences regarding your treatment plan: _____________________________________________________________________________________________________________________________________________________________________________________________________________________________________________________________________________________________________________________ Date/Time: ______________   Patient Signature: _________________________________   Date/Time: ______________    Signature: _________________________________        Date/Time: ______________

## 2019-03-01 NOTE — PSYCH
Subjective:     Patient ID: Yeny Mccray is a 59 y o  female  [facility] Clinical Progress Notes      Specialized Services Documentation  Therapist must complete separate progress note for each specific clinical activity in which the individual participated during the day  Group Psychotherapy     ([id number]) Yeny Mccray participated in group psychotherapy process today  Group began with this writer sharing the benefits of mindfulness  Clients engaged in check in, shared how they were feeling and a pleasant and productive activity they have planned for the weekend  Clients engaged in an open discussion on unhealthy coping mechanisms  Clients identified their own ways of unhealthy coping and explored feelings related to stress  Clients completed worksheets on ways to change coping  Guille Murray shared feeling tired and hopeful and spending time with friends over the weekend  [place] Blanco engaged in discussion throughout group discussing her problematic coping mechanisms  [place] Blanco discussed her habits and warning signs  [place] Blanco made progress towards goals through group participation and is encouraged to continue progressing towards long term goals through program compliance and participation      Tx Plan Objective: 1 1 Therapist:  Saul Odonnell LPC

## 2019-03-01 NOTE — PSYCH
Subjective:     Patient ID: Jered Kaiser is a 59 y o  female  [facility] Clinical Progress Notes      Specialized Services Documentation  Therapist must complete separate progress note for each specific clinical activity in which the individual participated during the day  Allied Therapy  9026-1212  Jered Kaiser actively participated in [facility] group focused on DBT skill interpersonal effectiveness  Group discussed the differences between being assertive, passive, passive aggressive, and aggressive  Group explored the importance of being assertive for effective communication  Group discussed ways to balance getting what you want, maintaining a relationship, and maintaining self-respect  Youngfrancine Vickey actively engaged in drumming exercise to practice assertiveness  She shared about the importance of assertiveness  Positive progress towards goal noted  Discharge at the end of the treatment day    Tx Plan Objective: 1 1, 1 4, Therapist: Óscar Baldwin    Education Therapy   Time:  9540-0319  Previous goal met: Yes   Readiness to Learning: Receptive  Barriers to Learning: None  Learning Assessment  Time: 3441-1991  Education Completed: Illness, Medication, Aftercare, Wellness Tools and Other Family Session  Teaching Method: Verbal, Written and Demonstration  Shared Area of Learning: Yes   Goal Set: Shared encouragement and learning with peers  Tx Plan Objective: 1 0, Therapist: Óscar Baldwin

## 2019-03-01 NOTE — PSYCH
[facility] Discharge Instructions:   Disposition: home  Address: [address] [place] [address] [address]    Diagnosis: Major Depressive Disorder  Allergies (Drug/Food): Allergies   Allergen Reactions    Lisinopril      Activity: No Restrictions  Diet:no recommendations  Smoking Cessation:not a smoker   Diagnostic/Laboratory Orders: None  Vaccines: If you received a vaccine, please notify your family physician on your next visit  For more information, please call [phone]  Follow-up appointments/Referrals:  Keep your appointment with Dr Nigel Leonardo on 03/06/2019 at 1230 and with Tiffanie Tracey (therpaist) 03/07/2019 at 0838  [facility]: [place] [phone] and [place] [phone]  Intake/Referral/Evaluation (Non-Emergency) *NON INSURED FOR FUNDING: [facility]: [phone], [facility]: [phone], [facility]: [phone] and [place]: [phone]  Crisis Intervention (Emergency) [place] Service: [facility]: [phone], [place]: [phone], [facility]: [phone], [facility]): [phone], [facility]: [phone] and [facility]: [phone]  _________________________________  National Crisis Intervention Hotline: 8-877.918.8434  National Suicide Crisis Hotline: 3-406.710.3439  I, the undersigned, have received and understand the above instructions          Patient/Rep Signature: __________________________________       Date/Time: ______________         Relationship: __________________________________________       Date/Time: ______________         Physician Signature: ____________________________________      Date/Time: ______________               Signature: ________________________________       Date/Time: ______________

## 2019-03-01 NOTE — PSYCH
Subjective:     Patient ID: Martha Hoang is a 59 y o  female  [facility] Discharge Summary:   Admission Date: 02/25/2019  Patient was referred by Dr Milka Roland  Discharge Date: 03/01/2019  Was this a routine discharge? yes   Diagnosis: Axis I:   1  Severe recurrent major depression without psychotic features [facility])        Treating Physician:  Dr Jean Burt MD  Treatment Complications: None  Presenting Need:   HPI : Per Dr Michel Nolen MD  Vamsi Latif a 59 y o  female with depression  and anxiety  referred by  Dr [facility] [employer] to depressed mood, anxiety ,  sleep disturbance and  appetite changes    Onset of symptoms was a few months ago with gradually worsening course since that time  Psychosocial Stressors: health  She developed Shingles on 9/2017 and has lot of sequela  She had a neuropsychiatric evaluation and  Was found that her executive function has decreased , she was not able to continue working and that has affected her life  She has feel more depressed since them  She states her  is very supportive  Schuyler Durbin  anxious, denies any suicidal or homicidal ideation, plan or intent  She denies any psychotic symptoms or manic episode   Her PHQ-9 is 11       Per this writer: Shital Munoz admitted to [facility] from Dr Milka Roland with increased depression and anxiety  Dilma Argueta stopped working 2 years ago after she developed shingles that left her with a lot of mental deficits  Britni Connors was unable to continue her job of 30 years as an  and as a result had to go on long term disability August 2018   She stated, " ever since I admitted that I was a person on disability I have been depressed, anxious and I feel useless   I was good at my job and now I have nothing to do all day except take care of my 3 dogs and 1 cat   That is the highlight of my day   My , Jody Borrego, is very supportive and encourages me to go out and socialize but I do not have the strength not the desire to do that  I need to get my life to mean something and not feel helpless " Christopher Adames denies SI/HI and is not psychotic or delusional on assessment   She lives with her  of 7 yrs and never had children        Strengths: Good supportive , willing to seek treatment, resilient and well educated                Previous Psychiatric/psychological treatment/year: Past Inpatient Psychiatric Treatment:   Never admitted, was  In Partial on the past     Past Outpatient Psychiatric Treatment:    Current psychiatrist: Dr Simon Guy  Past Suicide Attempts:              no    Course of treatment includes:    group counseling, medication management, individual case management, allied therapy, psychoeducation, psychiatric evaluation, family counseling, individual therapy  and family contact Family meeting with her , Linda Ramirez  Treatment Progress: [address] attended 5 treatment sessions in which objectives encouraged the development of consistent coping strategies, skills to decrease anxiety and healthy ways to increase supports  Family meeting with  Dheeraj Olivier)  to be completed today prior to d/c at [facility] request   Christopher Adames chivo list of outside support groups she can attend to continue with her mental wellness and growth  Upon discharge today, Christopher Adames denies SI/HI and she is not psychotic or delusional       Aftercare recommendations include: Christopher Adames has an appointment with Dr Washington Hendrix (Psychiartist) on 03/06/2019 at noon and with Barbara Pedraza (therapist ) on 03/07/2019 at 0830 which she plans to keep  Take medications as prescribed,consider Volunteering  DBSA Thursday evenings at [facility] 6pm ; DBSA Wednesday evenings at [facility] 7 pm ; [facility] (see web site)        Discharge Medications include:  Current Outpatient Medications:     albuterol (PROVENTIL HFA,VENTOLIN HFA) 90 mcg/act inhaler, Inhale 2 puffs every 4 (four) hours as needed for wheezing, Disp: , Rfl:     ARIPiprazole (ABILIFY) 5 mg tablet, Take 1 tablet (5 mg total) by mouth daily at bedtime, Disp: 30 tablet, Rfl: 0    fluticasone (FLONASE) 50 mcg/act nasal spray, 2 sprays into each nostril daily, Disp: , Rfl:     gabapentin (NEURONTIN) 400 mg capsule, Take 400 mg by mouth 2 (two) times a day, Disp: , Rfl:     LORazepam (ATIVAN) 0 5 mg tablet, Take 1 tablet (0 5 mg total) by mouth 2 (two) times a day, Disp: 60 tablet, Rfl: 0    losartan (COZAAR) 50 mg tablet, Take 50 mg by mouth daily, Disp: , Rfl:     lovastatin (MEVACOR) 10 MG tablet, Take 10 mg by mouth daily at bedtime, Disp: , Rfl:     ranitidine (ZANTAC) 150 mg tablet, Take 1 tablet by mouth every 24 hours, Disp: , Rfl:     traZODone (DESYREL) 50 mg tablet, Take 75 mg by mouth daily at bedtime, Disp: , Rfl:     venlafaxine 225 MG TB24, Take 1 tablet (225 mg total) by mouth daily, Disp: 30 tablet, Rfl: 0

## 2019-03-01 NOTE — PSYCH
Subjective:     Patient ID: Lorraine Clemente is a 59 y o  female  [facility] Clinical Progress Notes      Specialized Services Documentation  Therapist must complete separate progress note for each specific clinical activity in which the individual participated during the day  Group Psychotherapy ([id number]) SEAMUS Byers attended- : weekly  wellness assessment group today  Reviewing weekend supports and plan as we approach the weekend  Medications reviewed with  no concerns or questions at this time  Compliance of medications reviewed and understood  The six (6) dimension of wellness discussed (Physical, Emotional, Cognitive, Vocational, Social and Spiritual)  The format for assessing wellness and measuring progress   reviewed   Questions asked :  (1) I intend to improve in this area by    (2) My first step will be   (3) I will share my plans with Sissy Gutierrez ask for their support by saying    Helene Orourke did say " the 5 days at this program has given me insight that I was lacking  The first thing that I got from this program, not because I cannot work any longer due to my cognitive inability am I a failure  I need to find a new norm and this is what I have started doing  This has made me less anxious and stressed out and much more content  I do plan to [facility] a [facility] and I am looking forward to this  Helene Orourke is making  progress towards goals and objectives and will continue to attend wellness group even after d/c today  Tx Plan Objective:1 1,1 2,1 3,1 4      Case Management Note ([id number]) Leann Alegre RN    Met with Helene Munoz to review d/c plan and to discuss any questions or concerns she may have  She denied having any concerns or issues presently and she also denied SI/HI and is not paranoid or psychotic  She does have her follow up appointments with Albreto Osborne (2019 at noon  and Terry Rivas (therapist ) 03/04/2019 at 8 am   Sue Elizalde understood all follow up and discharge instructions  Current suicide risk : Low     Medications changes/added/denied? No    Treatment session number: 5    Individual Case Management Visit provided today?  Yes     [facility] follow up physician's orders: None

## 2019-03-01 NOTE — PSYCH
Family meeting done after program with Lissy Beckham (her ) and this writer  Phil Graves was very supportive and understanding of Christopher Adames diagnosis and her recovery  He feels very safe with her being discharged today  They both stated how much she gained from the 5 days of programming and that she will use it if she ever need to in the future

## 2019-03-01 NOTE — PSYCH
[facility] Clinical Progress Notes      Specialized Services Documentation  Therapist must complete separate progress note for each specific clinical activity in which the individual participated during the day         [facility] follow up physician's orders:   DATE 3/1/2019  TIME 10:57 AM   DISCHARGE TODAY  Jessica Quevedo MD

## 2019-07-05 ENCOUNTER — APPOINTMENT (OUTPATIENT)
Dept: LAB | Facility: IMAGING CENTER | Age: 59
End: 2019-07-05
Payer: COMMERCIAL

## 2019-07-05 ENCOUNTER — TRANSCRIBE ORDERS (OUTPATIENT)
Dept: ADMINISTRATIVE | Facility: HOSPITAL | Age: 59
End: 2019-07-05

## 2019-07-05 DIAGNOSIS — I10 ESSENTIAL HYPERTENSION: ICD-10-CM

## 2019-07-05 DIAGNOSIS — R51.9 FACIAL PAIN: ICD-10-CM

## 2019-07-05 DIAGNOSIS — E78.49 OTHER HYPERLIPIDEMIA: ICD-10-CM

## 2019-07-05 DIAGNOSIS — F41.8 OTHER SPECIFIED ANXIETY DISORDERS: ICD-10-CM

## 2019-07-05 DIAGNOSIS — E78.49 OTHER HYPERLIPIDEMIA: Primary | ICD-10-CM

## 2019-07-05 DIAGNOSIS — R53.83 OTHER FATIGUE: ICD-10-CM

## 2019-07-05 LAB
25(OH)D3 SERPL-MCNC: 30.6 NG/ML (ref 30–100)
ALBUMIN SERPL BCP-MCNC: 4.2 G/DL (ref 3.5–5)
ALP SERPL-CCNC: 73 U/L (ref 46–116)
ALT SERPL W P-5'-P-CCNC: 29 U/L (ref 12–78)
ANION GAP SERPL CALCULATED.3IONS-SCNC: 3 MMOL/L (ref 4–13)
AST SERPL W P-5'-P-CCNC: 17 U/L (ref 5–45)
BASOPHILS # BLD AUTO: 0.08 THOUSANDS/ΜL (ref 0–0.1)
BASOPHILS NFR BLD AUTO: 1 % (ref 0–1)
BILIRUB SERPL-MCNC: 0.32 MG/DL (ref 0.2–1)
BUN SERPL-MCNC: 21 MG/DL (ref 5–25)
CALCIUM SERPL-MCNC: 9.2 MG/DL (ref 8.3–10.1)
CHLORIDE SERPL-SCNC: 106 MMOL/L (ref 100–108)
CHOLEST SERPL-MCNC: 176 MG/DL (ref 50–200)
CO2 SERPL-SCNC: 32 MMOL/L (ref 21–32)
CREAT SERPL-MCNC: 0.92 MG/DL (ref 0.6–1.3)
EOSINOPHIL # BLD AUTO: 0.21 THOUSAND/ΜL (ref 0–0.61)
EOSINOPHIL NFR BLD AUTO: 3 % (ref 0–6)
ERYTHROCYTE [DISTWIDTH] IN BLOOD BY AUTOMATED COUNT: 12.2 % (ref 11.6–15.1)
ERYTHROCYTE [SEDIMENTATION RATE] IN BLOOD: 21 MM/HOUR (ref 0–20)
GFR SERPL CREATININE-BSD FRML MDRD: 67 ML/MIN/1.73SQ M
GLUCOSE P FAST SERPL-MCNC: 87 MG/DL (ref 65–99)
HCT VFR BLD AUTO: 42.6 % (ref 34.8–46.1)
HDLC SERPL-MCNC: 54 MG/DL (ref 40–60)
HGB BLD-MCNC: 13.5 G/DL (ref 11.5–15.4)
IMM GRANULOCYTES # BLD AUTO: 0.01 THOUSAND/UL (ref 0–0.2)
IMM GRANULOCYTES NFR BLD AUTO: 0 % (ref 0–2)
LDLC SERPL CALC-MCNC: 90 MG/DL (ref 0–100)
LYMPHOCYTES # BLD AUTO: 1.78 THOUSANDS/ΜL (ref 0.6–4.47)
LYMPHOCYTES NFR BLD AUTO: 29 % (ref 14–44)
MCH RBC QN AUTO: 29.1 PG (ref 26.8–34.3)
MCHC RBC AUTO-ENTMCNC: 31.7 G/DL (ref 31.4–37.4)
MCV RBC AUTO: 92 FL (ref 82–98)
MONOCYTES # BLD AUTO: 0.57 THOUSAND/ΜL (ref 0.17–1.22)
MONOCYTES NFR BLD AUTO: 9 % (ref 4–12)
NEUTROPHILS # BLD AUTO: 3.46 THOUSANDS/ΜL (ref 1.85–7.62)
NEUTS SEG NFR BLD AUTO: 58 % (ref 43–75)
NONHDLC SERPL-MCNC: 122 MG/DL
NRBC BLD AUTO-RTO: 0 /100 WBCS
PLATELET # BLD AUTO: 282 THOUSANDS/UL (ref 149–390)
PMV BLD AUTO: 8.9 FL (ref 8.9–12.7)
POTASSIUM SERPL-SCNC: 5.1 MMOL/L (ref 3.5–5.3)
PROT SERPL-MCNC: 7.6 G/DL (ref 6.4–8.2)
RBC # BLD AUTO: 4.64 MILLION/UL (ref 3.81–5.12)
SODIUM SERPL-SCNC: 141 MMOL/L (ref 136–145)
TRIGL SERPL-MCNC: 160 MG/DL
TSH SERPL DL<=0.05 MIU/L-ACNC: 0.83 UIU/ML (ref 0.36–3.74)
WBC # BLD AUTO: 6.11 THOUSAND/UL (ref 4.31–10.16)

## 2019-07-05 PROCEDURE — 80053 COMPREHEN METABOLIC PANEL: CPT

## 2019-07-05 PROCEDURE — 80061 LIPID PANEL: CPT

## 2019-07-05 PROCEDURE — 85652 RBC SED RATE AUTOMATED: CPT

## 2019-07-05 PROCEDURE — 84443 ASSAY THYROID STIM HORMONE: CPT

## 2019-07-05 PROCEDURE — 36415 COLL VENOUS BLD VENIPUNCTURE: CPT

## 2019-07-05 PROCEDURE — 85025 COMPLETE CBC W/AUTO DIFF WBC: CPT

## 2019-07-05 PROCEDURE — 82306 VITAMIN D 25 HYDROXY: CPT
